# Patient Record
Sex: MALE | Race: BLACK OR AFRICAN AMERICAN | NOT HISPANIC OR LATINO | Employment: FULL TIME | URBAN - METROPOLITAN AREA
[De-identification: names, ages, dates, MRNs, and addresses within clinical notes are randomized per-mention and may not be internally consistent; named-entity substitution may affect disease eponyms.]

---

## 2018-04-26 ENCOUNTER — HOSPITAL ENCOUNTER (EMERGENCY)
Facility: HOSPITAL | Age: 58
Discharge: HOME | End: 2018-04-27
Attending: EMERGENCY MEDICINE
Payer: OTHER GOVERNMENT

## 2018-04-26 DIAGNOSIS — R19.7 NAUSEA, VOMITING AND DIARRHEA: ICD-10-CM

## 2018-04-26 DIAGNOSIS — A08.4 VIRAL GASTROENTERITIS: Primary | ICD-10-CM

## 2018-04-26 DIAGNOSIS — R11.2 NAUSEA, VOMITING AND DIARRHEA: ICD-10-CM

## 2018-04-26 PROCEDURE — 3E0333Z INTRODUCTION OF ANTI-INFLAMMATORY INTO PERIPHERAL VEIN, PERCUTANEOUS APPROACH: ICD-10-PCS | Performed by: EMERGENCY MEDICINE

## 2018-04-27 VITALS
SYSTOLIC BLOOD PRESSURE: 155 MMHG | WEIGHT: 220 LBS | HEIGHT: 70 IN | DIASTOLIC BLOOD PRESSURE: 92 MMHG | BODY MASS INDEX: 31.5 KG/M2 | RESPIRATION RATE: 14 BRPM | OXYGEN SATURATION: 98 % | TEMPERATURE: 98.9 F | HEART RATE: 76 BPM

## 2018-04-27 LAB
ALBUMIN SERPL-MCNC: 4.5 G/DL (ref 3.4–5)
ALP SERPL-CCNC: 81 IU/L (ref 35–126)
ALT SERPL-CCNC: 21 IU/L (ref 16–63)
ANION GAP SERPL CALC-SCNC: 8 MEQ/L (ref 3–15)
AST SERPL-CCNC: 31 IU/L (ref 15–41)
ATRIAL RATE: 76
BASOPHILS # BLD: 0.03 K/UL (ref 0.01–0.1)
BASOPHILS NFR BLD: 0.2 %
BILIRUB SERPL-MCNC: 0.9 MG/DL (ref 0.3–1.2)
BUN SERPL-MCNC: 18 MG/DL (ref 8–20)
CALCIUM SERPL-MCNC: 9.5 MG/DL (ref 8.9–10.3)
CHLORIDE SERPL-SCNC: 104 MMOL/L (ref 98–109)
CO2 SERPL-SCNC: 26 MMOL/L (ref 22–32)
CREAT SERPL-MCNC: 1 MG/DL (ref 0.8–1.3)
DIFFERENTIAL METHOD BLD: ABNORMAL
EOSINOPHIL # BLD: 0 K/UL (ref 0.04–0.54)
EOSINOPHIL NFR BLD: 0 %
ERYTHROCYTE [DISTWIDTH] IN BLOOD BY AUTOMATED COUNT: 13.5 % (ref 11.6–14.4)
GFR SERPL CREATININE-BSD FRML MDRD: >60 ML/MIN/1.73M*2
GLUCOSE SERPL-MCNC: 137 MG/DL (ref 70–99)
HCT VFR BLDCO AUTO: 44.3 % (ref 40–51)
HGB BLD-MCNC: 14.6 G/DL (ref 13.7–17.5)
IMM GRANULOCYTES # BLD AUTO: 0.05 K/UL (ref 0–0.08)
IMM GRANULOCYTES NFR BLD AUTO: 0.4 %
LYMPHOCYTES # BLD: 0.23 K/UL (ref 1.2–3.5)
LYMPHOCYTES NFR BLD: 1.8 %
MCH RBC QN AUTO: 28.7 PG (ref 28–33.2)
MCHC RBC AUTO-ENTMCNC: 33 G/DL (ref 32.2–36.5)
MCV RBC AUTO: 87 FL (ref 83–98)
MONOCYTES # BLD: 0.55 K/UL (ref 0.3–1)
MONOCYTES NFR BLD: 4.3 %
NEUTROPHILS # BLD: 12.06 K/UL (ref 1.7–7)
NEUTS SEG NFR BLD: 93.3 %
NRBC BLD-RTO: 0 %
P AXIS: 65
PDW BLD AUTO: 11.1 FL (ref 9.4–12.4)
PLATELET # BLD AUTO: 196 K/UL (ref 150–350)
POTASSIUM SERPL-SCNC: 4 MMOL/L (ref 3.6–5.1)
PR INTERVAL: 160
PROT SERPL-MCNC: 8.1 G/DL (ref 6–8.2)
QRS DURATION: 86
QT INTERVAL: 394
QTC CALCULATION(BAZETT): 443
R AXIS: -66
RBC # BLD AUTO: 5.09 M/UL (ref 4.5–5.8)
SODIUM SERPL-SCNC: 138 MMOL/L (ref 136–144)
T WAVE AXIS: 6
TROPONIN I SERPL-MCNC: <0.02 NG/ML
VENTRICULAR RATE: 76
WBC # BLD AUTO: 12.92 K/UL (ref 3.8–10.5)

## 2018-04-27 PROCEDURE — 85025 COMPLETE CBC W/AUTO DIFF WBC: CPT | Performed by: EMERGENCY MEDICINE

## 2018-04-27 PROCEDURE — 96374 THER/PROPH/DIAG INJ IV PUSH: CPT

## 2018-04-27 PROCEDURE — 84484 ASSAY OF TROPONIN QUANT: CPT | Performed by: EMERGENCY MEDICINE

## 2018-04-27 PROCEDURE — 93005 ELECTROCARDIOGRAM TRACING: CPT | Performed by: EMERGENCY MEDICINE

## 2018-04-27 PROCEDURE — 85025 COMPLETE CBC W/AUTO DIFF WBC: CPT

## 2018-04-27 PROCEDURE — 36415 COLL VENOUS BLD VENIPUNCTURE: CPT

## 2018-04-27 PROCEDURE — 99283 EMERGENCY DEPT VISIT LOW MDM: CPT | Mod: 25

## 2018-04-27 PROCEDURE — 63600000 HC DRUGS/DETAIL CODE: Performed by: EMERGENCY MEDICINE

## 2018-04-27 PROCEDURE — 80053 COMPREHEN METABOLIC PANEL: CPT | Performed by: EMERGENCY MEDICINE

## 2018-04-27 RX ORDER — ONDANSETRON 4 MG/1
4 TABLET, FILM COATED ORAL EVERY 8 HOURS PRN
Qty: 12 TABLET | Refills: 0 | Status: SHIPPED | OUTPATIENT
Start: 2018-04-27

## 2018-04-27 RX ORDER — ONDANSETRON HYDROCHLORIDE 2 MG/ML
4 INJECTION, SOLUTION INTRAVENOUS ONCE
Status: COMPLETED | OUTPATIENT
Start: 2018-04-27 | End: 2018-04-27

## 2018-04-27 RX ADMIN — ONDANSETRON 4 MG: 2 INJECTION INTRAMUSCULAR; INTRAVENOUS at 00:48

## 2018-04-27 ASSESSMENT — ENCOUNTER SYMPTOMS
FEVER: 0
VOMITING: 1
JOINT SWELLING: 0
DIZZINESS: 0
HEMATURIA: 0
NUMBNESS: 0
NECK PAIN: 0
DIARRHEA: 1
DYSURIA: 0
SINUS PRESSURE: 0
WEAKNESS: 0
SORE THROAT: 0
FREQUENCY: 0
SHORTNESS OF BREATH: 0
EYE PAIN: 0
RHINORRHEA: 0
HEADACHES: 0
LIGHT-HEADEDNESS: 0
APPETITE CHANGE: 0
NERVOUS/ANXIOUS: 0
CHILLS: 0
BACK PAIN: 0
NAUSEA: 1
COUGH: 0
ABDOMINAL PAIN: 0

## 2018-04-27 NOTE — DISCHARGE INSTRUCTIONS
Follow with your regular doctor.  Return to the emergency department if condition worsens or for further concerns.  take medications as prescribed.

## 2018-04-27 NOTE — ED PROVIDER NOTES
HPI     Chief Complaint   Patient presents with   • Nausea   • Vomiting   • Diarrhea       56 y/o M presents to the ED for evaluation of nausea, vomiting and diarrhea that began suddenly today at 1500.  Multiple episodes of diarrhea and vomiting noted since initial onset.  Pt states he cannot keep anything down.  Associated sx include headache.  Denies recent sick contact, dizziness, light headedness, chest pain, shortness of breath, runny nose, changes to vision, trouble walking, skin rash,  abdominal pain, or any other concerns.  Pt last ate breakfast including scrapple, scrambled eggs, homefries, and 2 pieces of toast.  Pt reports his mother also ate eggs at breakfast.  He notes a similar occurrence in the past for which he attributed his symptoms to food poisoning.      PSHx cyst removal from scalp, knee surgery.   Pt works as a trauma ER nurse.          History provided by:  Patient   used: No    Vomiting   Severity:  Moderate  Timing:  Sporadic  Progression:  Unchanged  Chronicity:  New  Recent urination:  Normal  Relieved by:  Nothing  Worsened by:  Nothing  Associated symptoms: diarrhea    Associated symptoms: no abdominal pain, no chills, no cough, no fever, no headaches and no sore throat    Diarrhea   Associated symptoms: vomiting    Associated symptoms: no abdominal pain, no chills, no fever and no headaches         Patient History     History reviewed. No pertinent past medical history.    Past Surgical History:   Procedure Laterality Date   • KNEE SURGERY         History reviewed. No pertinent family history.    Social History   Substance Use Topics   • Smoking status: Never Smoker   • Smokeless tobacco: Never Used   • Alcohol use No       Systems Reviewed from Nursing Triage:          Review of Systems     Review of Systems   Constitutional: Negative for appetite change, chills and fever.   HENT: Negative for congestion, ear pain, rhinorrhea, sinus pressure and sore throat.   "  Eyes: Negative for pain.   Respiratory: Negative for cough and shortness of breath.    Cardiovascular: Negative for chest pain and leg swelling.   Gastrointestinal: Positive for diarrhea, nausea and vomiting. Negative for abdominal pain.   Genitourinary: Negative for dysuria, frequency and hematuria.   Musculoskeletal: Negative for back pain, joint swelling and neck pain.   Skin: Negative for rash.   Neurological: Negative for dizziness, weakness, light-headedness, numbness and headaches.   Psychiatric/Behavioral: Negative for self-injury and suicidal ideas. The patient is not nervous/anxious.         Physical Exam     ED Triage Vitals [04/26/18 2330]   Temp Heart Rate Resp BP SpO2   36.4 °C (97.6 °F) 78 20 (!) 146/101 98 %      Temp Source Heart Rate Source Patient Position BP Location FiO2 (%) (Set)   Oral -- -- -- --       Pulse Ox %: 99 % (04/27/18 0036)  Pulse Ox Interpretation: Normal (04/27/18 0036)  Heart Rate: 71 (04/27/18 0036)       Patient Vitals for the past 24 hrs:   BP Temp Temp src Pulse Resp SpO2 Height Weight   04/27/18 0045 - - - 88 (!) 21 98 % - -   04/27/18 0029 - - - 71 15 99 % - -   04/27/18 0027 129/79 - - - - - - -   04/27/18 0027 129/79 - - 73 (!) 11 98 % - -   04/26/18 2330 (!) 146/101 36.4 °C (97.6 °F) Oral 78 20 98 % 1.778 m (5' 10\") 99.8 kg (220 lb)           Physical Exam   Constitutional: He is oriented to person, place, and time. He appears well-developed and well-nourished.   HENT:   Head: Normocephalic.   Eyes: Pupils are equal, round, and reactive to light.   Neck: Neck supple.   Cardiovascular: Normal rate and regular rhythm.    Pulmonary/Chest: Effort normal and breath sounds normal.   Abdominal: Soft. Bowel sounds are increased. There is no tenderness.   Musculoskeletal: Normal range of motion. He exhibits no edema.   Neurological: He is alert and oriented to person, place, and time.   Skin: Skin is warm and dry.   Nursing note and vitals reviewed.           Procedures    ED " Course & MDM     Labs Reviewed   COMPREHENSIVE METABOLIC PANEL - Abnormal        Result Value    Glucose 137 (*)     Sodium 138      Potassium 4.0      Chloride 104      CO2 26      BUN 18      Creatinine 1.0      Calcium 9.5      AST (SGOT) 31      ALT (SGPT) 21      Alkaline Phosphatase 81      Total Protein 8.1      Albumin 4.5      Bilirubin, Total 0.9      eGFR >60.0      Anion Gap 8     CBC - Abnormal     WBC 12.92 (*)     RBC 5.09      Hemoglobin 14.6      Hematocrit 44.3      MCV 87.0      MCH 28.7      MCHC 33.0      RDW 13.5      Platelets 196      MPV 11.1     DIFF COUNT - Abnormal     Neutrophils, Absolute 12.06 (*)     Lymphocytes, Absolute 0.23 (*)     Eosinophils, Absolute 0.00 (*)     Differential Type Auto      nRBC 0.0      Immature Granulocytes 0.4      Neutrophils 93.3      Lymphocytes 1.8      Monocytes 4.3      Eosinophils 0.0      Basophils 0.2      Immature Granulocytes, Absolute 0.05      Monocytes, Absolute 0.55      Basophils, Absolute 0.03     CBC AND DIFFERENTIAL    Narrative:     The following orders were created for panel order CBC and differential.  Procedure                               Abnormality         Status                     ---------                               -----------         ------                     CBC[[id number]]                           Abnormal            Final result               Diff Count[[id number]]                    Abnormal            Final result                 Please view results for these tests on the individual orders.   TROPONIN I       No orders to display           [facility]           ED Course as of Apr 27 0249 Fri Apr 27, 2018   0114 56 y/o M presents to the ED for evaluation of N/V/D that began suddenly at 1500 today.  Possible etiologies of symptoms discussed with patient including but not limited to food intolerance, viral illness, etc.  Plan to check labs and studies.      [EG]   0245 Is and studies reviewed.  No acute findings.  After Zofran  and fluids.  Patient tolerating p.o. in the emergency department.  Plan to discharge patient to home and encourage close outpatient follow-up with his PCP.  [RP]      ED Course User Index  [EG] Elizabeth Thacker  [RP] Catherine Ramirez DO         Clinical Impressions as of Apr 27 0249   Viral gastroenteritis   Nausea, vomiting and diarrhea     Disposition:               By signing my name below, I, Elizabeth Thacker, attest that this documentation has been prepared under the direction and in the presence of Catherine Ramirez DO.      I, Catherine Ramirez, personally performed the services described in this documentation, as documented by the scribe in my presence, and it is both accurate and complete.  4/27/2018 2:46 AM           Elizabeth Thacker  04/27/18 0115       Elizabeth Thacker  04/27/18 0116       Catherine Ramirez DO  04/27/18 0249

## 2022-06-18 RX ORDER — SCOLOPAMINE TRANSDERMAL SYSTEM 1 MG/1
PATCH, EXTENDED RELEASE TRANSDERMAL
COMMUNITY
Start: 2022-03-15

## 2022-06-18 RX ORDER — ATORVASTATIN CALCIUM 20 MG/1
TABLET, FILM COATED ORAL
COMMUNITY
Start: 2019-10-10

## 2022-06-18 RX ORDER — METFORMIN HYDROCHLORIDE 500 MG/1
TABLET, EXTENDED RELEASE ORAL
COMMUNITY
Start: 2019-10-10

## 2022-06-18 RX ORDER — AMLODIPINE BESYLATE 5 MG/1
TABLET ORAL
COMMUNITY
Start: 2019-10-08